# Patient Record
Sex: FEMALE | Race: WHITE
[De-identification: names, ages, dates, MRNs, and addresses within clinical notes are randomized per-mention and may not be internally consistent; named-entity substitution may affect disease eponyms.]

---

## 2017-02-06 VITALS — DIASTOLIC BLOOD PRESSURE: 69 MMHG | SYSTOLIC BLOOD PRESSURE: 136 MMHG

## 2017-12-04 ENCOUNTER — HOSPITAL ENCOUNTER (OUTPATIENT)
Dept: HOSPITAL 61 - ECHO | Age: 67
Discharge: HOME | End: 2017-12-04
Attending: INTERNAL MEDICINE
Payer: COMMERCIAL

## 2017-12-04 DIAGNOSIS — I27.20: ICD-10-CM

## 2017-12-04 DIAGNOSIS — I47.2: ICD-10-CM

## 2017-12-04 DIAGNOSIS — I08.1: Primary | ICD-10-CM

## 2017-12-04 PROCEDURE — 93306 TTE W/DOPPLER COMPLETE: CPT

## 2017-12-04 NOTE — CARD
--------------- APPROVED REPORT --------------





EXAM: Two-dimensional and M-mode echocardiogram with Doppler and color Doppler.



Other Information 

Quality : Good



INDICATION

NSVT



2D DIMENSIONS 

RVDd3.4 (2.9-3.5cm)Left Atrium(2D)3.8 (1.6-4.0cm)

IVSd1.2 (0.7-1.1cm)Aortic Root(2D)2.9 (2.0-3.7cm)

LVDd4.2 (3.9-5.9cm)LVOT Diameter2.0 (1.8-2.4cm)

PWd1.3 (0.7-1.1cm)LVDs2.5 (2.5-4.0cm)

FS (%) 30.0 %SV55.8 ml

LVEF(%)60.0 (>50%)



Aortic Valve

AoV Peak Porfirio.146.9cm/sAoV VTI33.2cm

AO Peak GR.8.6mmHgLVOT Peak Porfirio.94.9cm/s

AO Mean GR.5mmHgAVA (VMAX)1.98cm2

ASTRID   (VTI)2.00cm2



Tricuspid Valve

TR P. Tjzdbtwo394oo/sRAP KVEFPUWT7umLm

TR Peak Gr.85xbFxWKHK68wyIw



Pulmonary Vein

S1 Atmkpxuv24.2cm/sD2 Vasxusui25.9cm/s



 LEFT VENTRICLE 

The left ventricle is normal size. There is mild concentric left ventricular hypertrophy. The left ve
ntricular systolic function is normal. The Ejection Fraction is 55-60%. There is normal LV segmental 
wall motion.



 RIGHT VENTRICLE 

The right ventricle is normal size. The right ventricular systolic function is normal.



 ATRIA 

The left atrium size is normal. The right atrium size is normal. The interatrial septum is intact wit
h no evidence for an atrial septal defect or patent foramen ovale as noted on 2-D or Doppler imaging.




 AORTIC VALVE 

The aortic valve is normal in structure and function. Doppler and Color Flow revealed trace to mild a
ortic regurgitation. There is no significant aortic valvular stenosis.



 MITRAL VALVE 

The mitral valve is normal in structure and function. There is no evidence of mitral valve prolapse. 
There is no mitral valve stenosis. Doppler and Color-flow revealed mild mitral regurgitation.



 TRICUSPID VALVE 

The tricuspid valve is normal in structure and function. Doppler and Color Flow revealed mild tricusp
id regurgitation. There is mild pulmonary hypertension. The PA pressure was estimated at 34 mmHg. The
re is no tricuspid valve stenosis.



 PULMONIC VALVE 

The pulmonary valve is normal in structure and function. Doppler and Color Flow revealed trace to mil
d pulmonic valvular regurgitation. There is no pulmonic valvular stenosis.



 GREAT VESSELS 

The aortic root is normal in size. The ascending aorta is normal in size. The IVC is normal in size a
nd collapses >50% with inspiration.



 PERICARDIAL EFFUSION 

There is no evidence of significant pericardial effusion.



Critical Notification

Critical Value: No



<Conclusion>

The left ventricular systolic function is normal.

The Ejection Fraction is 55-60%.

There is normal LV segmental wall motion.

Trace to mild aortic regurgitation.

Mild mitral regurgitation.

Mild tricuspid regurgitation.

The PA pressure was estimated at 34 mmHg.

There is no evidence of significant pericardial effusion.

## 2018-03-22 ENCOUNTER — HOSPITAL ENCOUNTER (OUTPATIENT)
Dept: HOSPITAL 61 - ENDOS | Age: 68
Discharge: HOME | End: 2018-03-22
Attending: INTERNAL MEDICINE
Payer: COMMERCIAL

## 2018-03-22 DIAGNOSIS — Z88.2: ICD-10-CM

## 2018-03-22 DIAGNOSIS — Z80.0: ICD-10-CM

## 2018-03-22 DIAGNOSIS — K57.30: ICD-10-CM

## 2018-03-22 DIAGNOSIS — Z12.11: Primary | ICD-10-CM

## 2018-03-22 DIAGNOSIS — Z98.890: ICD-10-CM

## 2018-03-22 DIAGNOSIS — Z79.899: ICD-10-CM

## 2018-03-22 DIAGNOSIS — I10: ICD-10-CM

## 2018-03-22 DIAGNOSIS — E03.9: ICD-10-CM

## 2018-03-22 DIAGNOSIS — K64.0: ICD-10-CM

## 2018-03-22 RX ADMIN — SODIUM CHLORIDE, SODIUM LACTATE, POTASSIUM CHLORIDE, AND CALCIUM CHLORIDE 1 MLS/HR: .6; .31; .03; .02 INJECTION, SOLUTION INTRAVENOUS at 08:40

## 2018-07-10 ENCOUNTER — HOSPITAL ENCOUNTER (OUTPATIENT)
Dept: HOSPITAL 61 - CCL | Age: 68
Discharge: HOME | End: 2018-07-10
Attending: INTERNAL MEDICINE
Payer: COMMERCIAL

## 2018-07-10 VITALS
SYSTOLIC BLOOD PRESSURE: 109 MMHG | SYSTOLIC BLOOD PRESSURE: 109 MMHG | DIASTOLIC BLOOD PRESSURE: 64 MMHG | DIASTOLIC BLOOD PRESSURE: 64 MMHG | DIASTOLIC BLOOD PRESSURE: 64 MMHG | SYSTOLIC BLOOD PRESSURE: 109 MMHG

## 2018-07-10 DIAGNOSIS — F32.9: ICD-10-CM

## 2018-07-10 DIAGNOSIS — I10: ICD-10-CM

## 2018-07-10 DIAGNOSIS — Z90.710: ICD-10-CM

## 2018-07-10 DIAGNOSIS — Z98.890: ICD-10-CM

## 2018-07-10 DIAGNOSIS — E03.9: ICD-10-CM

## 2018-07-10 DIAGNOSIS — Z88.1: ICD-10-CM

## 2018-07-10 DIAGNOSIS — Z45.09: Primary | ICD-10-CM

## 2018-07-10 DIAGNOSIS — Z90.721: ICD-10-CM

## 2018-07-10 DIAGNOSIS — E78.00: ICD-10-CM

## 2018-07-10 PROCEDURE — 33284: CPT

## 2018-07-10 RX ADMIN — LIDOCAINE HYDROCHLORIDE,EPINEPHRINE BITARTRATE 1 ML: 20; .01 INJECTION, SOLUTION INFILTRATION; PERINEURAL at 10:33

## 2019-09-16 ENCOUNTER — HOSPITAL ENCOUNTER (OUTPATIENT)
Dept: HOSPITAL 61 - ECHO | Age: 69
Discharge: HOME | End: 2019-09-16
Attending: INTERNAL MEDICINE
Payer: COMMERCIAL

## 2019-09-16 DIAGNOSIS — I11.9: Primary | ICD-10-CM

## 2019-09-16 DIAGNOSIS — I47.2: ICD-10-CM

## 2019-09-16 PROCEDURE — 93306 TTE W/DOPPLER COMPLETE: CPT

## 2019-09-16 NOTE — CARD
MR#: [id number]

Account#: [id number]

Accession#: [id number]

Date of Study: 09/16/2019

Ordering Physician: DIANE ALEXANDER, 

Referring Physician: DIANE ALEXANDER 

Tech: Madonna Stephenson VERONA





--------------- APPROVED REPORT --------------





EXAM: Two-dimensional and M-mode echocardiogram with Doppler and color Doppler.



Other Information 

Quality : AverageHR: 74bpm

Rhythm : NSR



INDICATION

NSVT



2D DIMENSIONS 

RVDd3.4 (2.9-3.5cm)Left Atrium(2D)3.7 (1.6-4.0cm)

IVSd1.3 (0.7-1.1cm)Aortic Root(2D)2.7 (2.0-3.7cm)

LVDd4.3 (3.9-5.9cm)LVOT Diameter1.7 (1.8-2.4cm)

PWd1.0 (0.7-1.1cm)LVDs2.8 (2.5-4.0cm)

FS (%) 34.3 %SV53.6 ml

LVEF(%)63.6 (>50%)



M-Mode DIMENSIONS 

Left Atrium(MM)4.03 (2.5-4.0cm)Aortic Root2.41 (2.2-3.7cm)



Aortic Valve

AoV Peak Porfirio.129.3cm/sAoV VTI29.1cm

AO Peak GR.6.7mmHgLVOT Peak Porfirio.86.1cm/s

AO Mean GR.3mmHgAVA (VMAX)1.53cm2

ASTRID   (VTI)1.60cm2



Mitral Valve

MV E Odchomwi60.4cm/sMV DECEL FIVE457eg

MV A Dkfnqzar97.7cm/sE/A  Ratio1.1



Pulmonary Valve

PV Peak Vkkirkrx74.9cm/s



Tricuspid Valve

TR P. Hrasctkl812zd/sRAP AUMTCLLS3lyTj

TR Peak Gr.92xfJvTLCR98omLj



 LEFT VENTRICLE 

The left ventricle is normal size. There is mild concentric left ventricular hypertrophy. The left ve
ntricular systolic function is normal and the ejection fraction is within normal range. The Ejection 
Fraction is 60-65%. There is normal LV segmental wall motion. Transmitral Doppler flow pattern is Gra
de II-pseudonormal filling dynamics.



 RIGHT VENTRICLE 

The right ventricle is normal size. There is normal right ventricular wall thickness. The right ventr
icular systolic function is normal.



 ATRIA 

The left atrium is borderline dilated. The right atrium size is normal. The interatrial septum is int
act with no evidence for an atrial septal defect or patent foramen ovale as noted on 2-D or Doppler i
maging.



 AORTIC VALVE 

Not well visualized. Grossly appears trileaflet. Doppler and Color Flow revealed no significant aorti
c regurgitation. There is no significant aortic valvular stenosis. There is no aortic valvular vegeta
tion.



 MITRAL VALVE 

The mitral valve is normal in structure and function. There is no evidence of mitral valve prolapse. 
There is no mitral valve stenosis. Doppler and Color-flow revealed trace mitral regurgitation.



 TRICUSPID VALVE 

The tricuspid valve is normal in structure and function. Doppler and Color Flow revealed trace tricus
pid regurgitation. The PA pressure was estimated at 23 mmHg. There is no tricuspid valve prolapse or 
vegetation. There is no tricuspid valve stenosis.



 PULMONIC VALVE 

The pulmonic valve is not well visualized.



 GREAT VESSELS 

The aortic root is normal in size. The ascending aorta is normal in size. The IVC is normal in size a
nd collapses >50% with inspiration.



 PERICARDIAL EFFUSION 

There is no evidence of significant pericardial effusion.



Critical Notification

Critical Value: No



<Conclusion>

The left ventricular systolic function is normal and the ejection fraction is within normal range. Th
e Ejection Fraction is 60-65%.

There is normal LV segmental wall motion.



Signed by : Manuel Mane, 

Electronically Approved : 09/16/2019 10:18:49

## 2020-09-16 ENCOUNTER — HOSPITAL ENCOUNTER (OUTPATIENT)
Dept: HOSPITAL 61 - NM | Age: 70
Discharge: HOME | End: 2020-09-16
Attending: INTERNAL MEDICINE
Payer: COMMERCIAL

## 2020-09-16 DIAGNOSIS — I08.8: Primary | ICD-10-CM

## 2020-09-16 DIAGNOSIS — I27.20: ICD-10-CM

## 2020-09-16 DIAGNOSIS — I10: ICD-10-CM

## 2020-09-16 DIAGNOSIS — I47.2: ICD-10-CM

## 2020-09-16 PROCEDURE — 93306 TTE W/DOPPLER COMPLETE: CPT

## 2020-09-16 PROCEDURE — 78452 HT MUSCLE IMAGE SPECT MULT: CPT

## 2020-09-16 PROCEDURE — A9500 TC99M SESTAMIBI: HCPCS

## 2020-09-16 PROCEDURE — 93017 CV STRESS TEST TRACING ONLY: CPT

## 2020-09-16 NOTE — CARD
MR#: [id number]

Account#: [id number]

Accession#: [id number]

Date of Study: 09/16/2020

Ordering Physician: DIANE ALEXANDER, 

Referring Physician: DIANE ALEXANDER 

Tech: Fatmata Hess RDCS





--------------- APPROVED REPORT --------------





EXAM: Two-dimensional and M-mode echocardiogram with Doppler and color Doppler.



Other Information 

Quality : Good



INDICATION

Nonsustained V-Tach



2D DIMENSIONS 

RVDd3.5 (2.9-3.5cm)Left Atrium(2D)3.8 (1.6-4.0cm)

IVSd0.9 (0.7-1.1cm)Aortic Root(2D)2.7 (2.0-3.7cm)

LVDd4.5 (3.9-5.9cm)LVOT Diameter2.0 (1.8-2.4cm)

PWd1.0 (0.7-1.1cm)LVDs2.9 (2.5-4.0cm)

FS (%) 30.0 %SV59.7 ml

LVEF(%)60.0 (>50%)



Aortic Valve

AoV Peak Porfirio.157.7cm/sAoV VTI34.8cm

AO Peak GR.9.9mmHgLVOT Peak Porfirio.110.1cm/s

AO Mean GR.5mmHgAVA (VMAX)2.19cm2

ASTRID   (VTI)2.30cm2



Mitral Valve

MV E Fdzwlqww70.0cm/sMV DECEL KZEF943fh

MV A Yqllgtes34.4cm/sE/A  Ratio1.1



Tricuspid Valve

TR P. Ofjrwgsm925ml/sRAP CYCESQRE3ccSx

TR Peak Gr.96sgStHZOE49nuSz



Pulmonary Vein

S1 Ysxdhnfs72.7cm/sD2 Rgparzbw82.4cm/s



 LEFT VENTRICLE 

The left ventricle is normal size. There is normal left ventricular wall thickness. The left ventricu
lar systolic function is normal. The Ejection Fraction is 55-60%. There is normal LV segmental wall m
otion. The left ventricular diastolic function and filling is normal for age.



 RIGHT VENTRICLE 

The right ventricle is normal size. The right ventricular systolic function is normal.



 ATRIA 

The left atrium size is normal. The right atrium size is normal. The interatrial septum is intact wit
h no evidence for an atrial septal defect or patent foramen ovale as noted on 2-D or Doppler imaging.




 AORTIC VALVE 

The aortic valve is normal in structure and function. Doppler and Color Flow revealed trace aortic re
gurgitation. There is no significant aortic valvular stenosis.



 MITRAL VALVE 

The mitral valve is calcified but opens well. There is no evidence of mitral valve prolapse. There is
 no mitral valve stenosis. Doppler and Color-flow revealed mild mitral regurgitation.



 TRICUSPID VALVE 

The tricuspid valve is normal in structure and function. Doppler and Color Flow revealed mild tricusp
id regurgitation. There is mild pulmonary hypertension. The PA pressure was estimated at 35 mmHg. The
re is no tricuspid valve stenosis.



 PULMONIC VALVE 

The pulmonary valve is normal in structure and function. Doppler and Color Flow revealed mild pulmoni
c valvular regurgitation. There is no pulmonic valvular stenosis.



 GREAT VESSELS 

The aortic root is normal in size. The ascending aorta is normal in size. The IVC is normal in size a
nd collapses >50% with inspiration.



 PERICARDIAL EFFUSION 

There is no evidence of significant pericardial effusion.



Critical Notification

Critical Value: No



<Conclusion>

The left ventricular systolic function is normal.

The Ejection Fraction is 55-60%.

There is normal LV segmental wall motion.

Mild mitral regurgitation.

Mild tricuspid regurgitation.

The PA pressure was estimated at 35 mmHg.

There is no evidence of significant pericardial effusion.



Signed by : Diane Alexander, 

Electronically Approved : 09/16/2020 10:49:48

## 2020-09-16 NOTE — RAD
MR#: [id number]

Account#: [id number]

Accession#: [id number]

Date of Study: 09/16/2020

Ordering Physician: DIANE ALEXANDER, 

Referring Physician: ALFRED US Tech: AMIRA Argueta, ARRT (R) (N)





--------------- APPROVED REPORT --------------





Test Type:          Pharmacological

Stress Nurse/Tech: Yashira Vasquez RN

Test Indications: NSVT

Cardiac History: Hypertension

Medications:     See Electronic Medical Record

Medical History: See Electronic Medical Record

Resting ECG:     SB

Resting Heart Rate: 52 bpm

Resting Blood Pressure: 141/61mmHg

Pretest Chest Pain: No chest pain



Nurse/Tech Notes

S1,S2 and lungs clear to auscultation.

Consent: The procedure was explained to the patient in lay terms. Informed consent was witnessed. Todd
eout was entered into [employer]. History and Stress Test performed by ROBERTH Almanza



Pharm. Details

Pharmacologic stress testing was performed using 0.4mg per 5ml of regadenoson given intravenously ove
r 7-10 seconds.



Stress Symptoms

No chest pain or symptoms.



POST EXERCISE

Reason for Termination: Infusion complete

Target HR: No

Max HR: 81 bpm

64% of Maximum Predicted HR: 126 bpm

Max Blood Pressure: 147/51mmHg

Blood Pressure response to exercise: Normal blood pressure response during stress.

Heart Rate response to exercise: WNL

Chest Pain: No. 

Arrhythmia: No. 

ST Change: No. 



INTERPRETATION

Stress EKG Conclusion: Baseline EKG showed sinus rhythm.  No ischemic changes at peak stress.  No arr
hythmias.



Imaging Protocol

IMAGE PROTOCOL: Rest Tc-99m/stress Tc-99m 1 day



Rest:            Stress:         Viability:   

Radiopharm.Tc99m UgsfyvdfpQk35a Sestamibi

Hiio69mOw            32.1mCi            

Img Date  09/16/2020 09/16/2020      

Inj-Img Pmfu57xxe.           60min.           



Rest Admin Site:IV - Right AntecubitalAdministrator:AMIRA Argueta, ARRT (R)(N)

Stress Admin Site: IV - Right AntecubitalAdministrator: RT Mikaela (R)(N)



STRESS DATA

End Diast. Vol.75.0mlLVEDV index BSA41.0ml

End Syst. Vol.19.0mlLVESV index BSA11.0ml

Myocardial Fvkm648.0gEject. Qugmswss16.0%



Stress Scores

Regional WT0.00Summed WT0.00

Regional WM0.00Summed WM1.00



Study quality was good.  Left Ventricular size was Normal at Rest and Stress.

Lung uptake was .  Left Ventricular ejection fraction is 70%.

The rest and stress images show normal perfusion, normal contraction and thickening.



LV Perf. Quant

17 Seg. SSS1.00

17 Seg. SRS1.00

17 Seg. SDS1.00

Stress Defect Extent (% LAD)0.00Rest Defect Extent (% LAD)8.80Rev. Defect Extent (% LAD)0.00

Stress Defect Extent (% LCX) 2.50Rest Defect Extent (% LCX)0.00Rev. Defect Extent (% LCX)1.30

Stress Defect Extent (% RCA)0.00Rest Defect Extent (% RCA)0.00Rev. Defect Extent (% RCA)0.00

Stress Defect Extent (% JLUIS)0.40Rest Defect Extent (% JLUIS)3.90Rev. Defect Extent (% JLUIS)0.20



Conclusion

1. Regadenoson cardioisotope stress test did not show any evidence of ischemia or infarct.

2. Normal left ventricular systolic function with ejection fraction calculated at 70%.

3. Low risk for cardiac events.



Signed by : Diane Alexander, 

Electronically Approved : 09/16/2020 14:03:18

## 2021-09-22 ENCOUNTER — HOSPITAL ENCOUNTER (OUTPATIENT)
Dept: HOSPITAL 61 - ECHO | Age: 71
End: 2021-09-22
Attending: INTERNAL MEDICINE
Payer: MEDICARE

## 2021-09-22 DIAGNOSIS — I47.2: ICD-10-CM

## 2021-09-22 DIAGNOSIS — I08.8: Primary | ICD-10-CM

## 2021-09-22 PROCEDURE — 93306 TTE W/DOPPLER COMPLETE: CPT

## 2021-09-22 NOTE — CARD
MR#: [id number]

Account#: [id number]

Accession#: [id number]

Date of Study: 09/22/2021

Ordering Physician: DIANE ALEXANDER, 

Referring Physician: DIANE ALEXANDER 

Tech: Arlene Marsh [facility]





--------------- APPROVED REPORT --------------





EXAM: Two-dimensional and M-mode echocardiogram with Doppler and color Doppler.



Other Information 

Quality : GoodHR: 53bpm

Rhythm : NSR



INDICATION

Dyspnea 



RISK FACTORS

Hypertension 

Obesity   

Hyperlipidemia



2D DIMENSIONS 

RVDd3.4 (2.9-3.5cm)Left Atrium(2D)3.8 (1.6-4.0cm)

IVSd1.3 (0.7-1.1cm)Aortic Root(2D)2.8 (2.0-3.7cm)

LVDd4.3 (3.9-5.9cm)LVOT Diameter1.8 (1.8-2.4cm)

PWd1.3 (0.7-1.1cm)LVDs2.0 (2.5-4.0cm)

FS (%) 53.3 %SV70.5 ml

LVEF(%)84.5 (>50%)



Aortic Valve

AoV Peak Porfirio.128.1cm/sAoV VTI29.4cm

AO Peak GR.6.6mmHgLVOT Peak Porfirio.114.8cm/s

AO Mean GR.3mmHgAVA (VMAX)2.36cm2



Mitral Valve

MV E Bhgggjel95.6cm/sMV DECEL VOEY998mj

MV A Ioldrhfy51.3cm/sE/A  Ratio1.1



Pulmonary Valve

PV Peak Ypfgxyfr096.4cm/s



Tricuspid Valve

TR P. Cbtusmrs170pp/sTR Peak Gr.32mmHg



 LEFT VENTRICLE 

The left ventricle is normal size. There is normal left ventricular wall thickness. The left ventricu
lar systolic function is normal. The ejection fraction is 60-65%. There is normal LV segmental wall m
otion. The left ventricular diastolic function and filling is normal for age.



 RIGHT VENTRICLE 

The right ventricle is normal size. There is normal right ventricular wall thickness. The right ventr
icular systolic function is normal.



 ATRIA 

The left atrium size is normal. The right atrium size is normal. The interatrial septum is intact wit
h no evidence for an atrial septal defect or patent foramen ovale as noted on 2-D or Doppler imaging.




 AORTIC VALVE 

The aortic valve is normal in structure and function. Doppler and Color Flow revealed no significant 
aortic regurgitation. There is no significant aortic valvular stenosis.



 MITRAL VALVE 

The mitral valve is normal in structure and function. There is no evidence of mitral valve prolapse. 
There is no mitral valve stenosis. Doppler and Color-flow revealed mild mitral regurgitation.



 TRICUSPID VALVE 

The tricuspid valve is normal in structure and function. Doppler and Color Flow revealed mild tricusp
id regurgitation. Estimated PAP 35 mmHg. There is no tricuspid valve stenosis.



 PULMONIC VALVE 

The pulmonary valve is normal in structure and function. Doppler and Color Flow revealed mild pulmoni
c valvular regurgitation.



 GREAT VESSELS 

The aortic root is normal in size. The ascending aorta is normal in size. The IVC is normal in size a
nd collapses >50% with inspiration.



 PERICARDIAL EFFUSION 

There is no evidence of significant pericardial effusion.



Critical Notification

Critical Value: No



<Conclusion>

The left ventricular systolic function is normal.

The ejection fraction is 60-65%.

There is normal LV segmental wall motion.

Mild mitral regurgitation.

Mild tricuspid regurgitation.  Estimated PAP 35 mmHg.

There is no evidence of significant pericardial effusion.



Signed by : Diane Alexander, 

Electronically Approved : 09/22/2021 16:03:04